# Patient Record
Sex: MALE | Race: WHITE | NOT HISPANIC OR LATINO | Employment: FULL TIME
[De-identification: names, ages, dates, MRNs, and addresses within clinical notes are randomized per-mention and may not be internally consistent; named-entity substitution may affect disease eponyms.]

---

## 2024-03-13 ENCOUNTER — TRANSCRIBE ORDERS (OUTPATIENT)
Dept: OTHER | Age: 59
End: 2024-03-13

## 2024-03-13 DIAGNOSIS — D03.9 MELANOMA IN SITU (H): Primary | ICD-10-CM

## 2024-03-14 ENCOUNTER — TELEPHONE (OUTPATIENT)
Dept: DERMATOLOGY | Facility: CLINIC | Age: 59
End: 2024-03-14

## 2024-03-14 NOTE — TELEPHONE ENCOUNTER
Called and spoke with Garrett.     Garrett does not have health insurance. He's working on getting insurance, but he has no exact date for when he will be able to secure it.     I am routing this to the financial counselor to advise further.     Pt will be in [place] 03/20-03/27 and unable to take calls while he is there.     Natasha Silva, Procedure  3/14/2024 10:54 AM

## 2024-03-28 NOTE — TELEPHONE ENCOUNTER
Routing again for an update.     Please advise if this needs to be routed to a different recipient.     Natasha Silva, Procedure  3/28/2024 4:16 PM

## 2024-04-26 NOTE — TELEPHONE ENCOUNTER
[facility] Call Center    Phone Message    May a detailed message be left on voicemail: yes     Reason for Call: Other: Pt said he will have health insurance as of May 1 - pt does not know what kind of any information. Pt will call back as soon as he has any information. Thanks      Action Taken: Message routed to:  [facility] Derm    Travel Screening: Not Applicable

## 2024-04-26 NOTE — TELEPHONE ENCOUNTER
[facility] Call Center    Phone Message    May a detailed message be left on voicemail: yes     Reason for Call: Other: please follow-up with pt on the finances.      Action Taken: Message routed to:  [facility] derm    Travel Screening: Not Applicable

## 2024-05-02 NOTE — TELEPHONE ENCOUNTER
Left vm for pt to call back with his new insurance.     Natasha Silva, Procedure  5/2/2024 8:16 AM

## 2024-05-13 NOTE — TELEPHONE ENCOUNTER
Patient calling back to schedule. Informed call center that dermatology surgery scheduler is out of office and message will be sent to her requesting she call him back. Call center states they will inform patient of this. Advised he call back tomorrow when  is here.     Rudi Dia, EMT

## 2024-06-01 NOTE — TELEPHONE ENCOUNTER
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 Lentigo maligna pattern.      RECORDS REQUESTED FROM:       Clinic name Comments Records Status Imaging Status   [facility] 2.29.24  Path # [id number]  CE

## 2024-06-03 ENCOUNTER — OFFICE VISIT (OUTPATIENT)
Dept: DERMATOLOGY | Facility: CLINIC | Age: 59
End: 2024-06-03
Payer: COMMERCIAL

## 2024-06-03 ENCOUNTER — PRE VISIT (OUTPATIENT)
Dept: DERMATOLOGY | Facility: CLINIC | Age: 59
End: 2024-06-03

## 2024-06-03 DIAGNOSIS — D03.21 MELANOMA IN SITU OF RIGHT EAR (H): Primary | ICD-10-CM

## 2024-06-03 PROCEDURE — 99204 OFFICE O/P NEW MOD 45 MIN: CPT | Mod: GC | Performed by: DERMATOLOGY

## 2024-06-03 RX ORDER — SIMVASTATIN 40 MG
40 TABLET ORAL
COMMUNITY
Start: 2024-02-16

## 2024-06-03 ASSESSMENT — PAIN SCALES - GENERAL: PAINLEVEL: NO PAIN (0)

## 2024-06-03 NOTE — NURSING NOTE
Chief Complaint   Patient presents with    Derm Problem     Patient presents for mohs consultation for melanoma in situ right superior bola of antihelix.      Myrna Flores LPN

## 2024-06-03 NOTE — PATIENT INSTRUCTIONS
In advance of your upcoming dermatologic surgery appointment, we wanted to send you a patient handout (see below) on Mohs micrographic surgery, so that you may have an idea of what to expect. This handout is quite detailed as it contains many of the most frequently asked questions that our patients ask.    Mohs Surgery Quick Tips    Please reserve the half day that you are with us and do not schedule any other appointments the morning of your surgery date. We cannot guarantee what time the surgery will be done as it will depend on how many times the Mohs surgeon has to go back and remove more tissue to completely remove your skin cancer.  If you suspect you will experience excess anxiety or claustrophobia during the procedure, please let the Mohs surgeon know prior to surgery to allow us time to address this. If an anti-anxiety medication is required, then you will need a designated  to drive you home.  Mohs surgery is done under local anesthesia, so you should eat breakfast and take your regularly scheduled medications. You do NOT need to fast.  Wear comfortable, loose-fitting clothing that can easily be taken off and put back on before and after surgery.  Most of your time with us will be spent waiting for tissue to be processed and carefully looked at under the microscope by the Mohs surgeon. Bring with you a book, tablet, or smartphone that you can use to spend the waiting time. You are allowed to eat lunch.  You should have a designated  if surgery will involve an area that can occlude vision. This is because you can get swelling/bruising immediately after surgery and will go home with a bulky bandage that could obstruct your view of driving safely.  In most cases, you will go home with a bulky pressure dressing over the site that will need to remain on, clean, and dry for the first 48 hours. You will not be able to get the site wet for the first 48 hours. This bulky pressure dressing is to help  prevent post-op bleeding. Your nurse will provide detailed wound care instructions verbally and in writing on the day of surgery.  The overwhelming majority of patients manage their normal post-op pain with Tylenol alternating with ibuprofen.   Any time the skin is cut surgically, including with Mohs surgery, a scar forms. Scars are unavoidable but are minimized with the Mohs surgery approach. A scar is thought to be better than a skin cancer that could become a serious risk to your future health. The goal with Mohs surgery is for the scar to be barely noticeable by an acquaintance talking with you at a normal conversational distance (say, 4-5 feet away) by 3 months after your surgery. In the meantime, please be patient as it takes about 3 months for scars to mature in appearance and begin to fade.  Do NOT wear make-up on the day of surgery if the skin cancer is on your face.  Do NOT use Neosporin on your wound. It is not effective at preventing infections and can lead to irritation similar to an allergic reaction at the wound site.  Do not use harsh soaps like Dial soap or [place] on your wound as this will also lead to irritation.    What is  Mohs micrographic surgery ?   Mohs micrographic surgery is considered the most effective treatment for many skin cancers. It is named after the late Dr. Frederic Mohs, who pioneered this technique. Note that  Mohs  surgery is not an abbreviation or acronym, but is named after Dr. Mohs.    Overview of Mohs Micrographic Surgery  Skin cancer often resembles the  tip of the iceberg  with more tumor cells growing downward and outward into the skin like the roots of a tree. These  roots  are not visible with the naked eye, but instead can be seen under a microscope. With the Mohs technique, the dermatologic surgeon can precisely identify and remove an entire tumor while leaving the surrounding healthy tissue intact and unharmed. Mohs surgery has the highest success rate of all  treatments for many skin cancers - up to 99%.  Mohs Surgeons: Fellowship-trained Mohs surgeons are specially trained as a cancer surgeon, pathologist, and reconstructive surgeon all in one.    Advantages of Mohs Surgery  Mohs surgery is unique and so effective because of the way the removed tissue is microscopically examined, evaluating 100% of the surgical margins. The pathologic interpretation of the tissue margins is done on site by the Mohs surgeon, who is specially trained in the reading of these slides.  Advantages of Mohs surgery include:  - Ensuring complete cancer removal during surgery to provide the best cure rate and protect against cancer recurrence  - Minimizing the amount of healthy tissue lost  - Maximizing the functional and cosmetic outcome resulting from surgery  - Repairing the site of the cancer the same day the cancer is removed (in most cases)  - Curing skin cancer when other methods have failed  Other skin cancer treatment methods require the provider to often blindly estimate the amount of tissue to treat and remove, which can result in the unnecessary removal of healthy skin tissue, as well as the skin cancer coming back if any part of it is left behind.     Your Mohs Procedure  Mohs skin cancer surgery is an outpatient procedure, which takes place in our on-site surgical suite. Our suite is equipped with a dedicated Mohs laboratory for microscopic examination of tissue. Surgeries start early in the morning and are completed the same day, depending on the extent of the tumor and the amount of reconstruction necessary.  First, you will receive local anesthesia (i.e., injections with lidocaine anesthetic) around the area of the tumor, so you are awake during the entire procedure. The use of local anesthesia versus general anesthesia provides many benefits, including preventing a lengthy recovery, possible side effects from general anesthesia, and cost. You are completely numb in the area of  the surgery, however, so the procedure is comfortable.      After the area has been numbed, all visible tumor, along with a thin layer of surrounding tissue, is taken out with a scalpel blade. After this, patients are bandaged by our nursing staff and can rest in the reception area, cafe on the first floor at [facility] [place] [place], or can remain in the patient room while awaiting testing results. A specially trained technician prepares the tissue and puts it on slides for your Mohs surgeon to examine under a microscope. Waiting time can range from 60-90 minutes while the tissue is being processed. If cancer involving the edges of the removed tissue is seen by the Mohs surgeon under the microscope, then another layer of tissue is removed from the area where the cancer was detected using a scalpel blade. This way only cancerous tissue is targeted during the procedure, minimizing the loss of healthy tissue. These steps are repeated until no cancer is remaining. Most skin cancers require 1 to 3 rounds for complete removal (all performed on the same day). Aggressive cancers can take several rounds of surgery to cure. Sometimes skin cancer can appear to be small to the naked eye but in fact is larger in reality. This means that the wound left behind after skin cancer removal could be larger than what you may expect, but remember that this may be necessary to remove all the skin cancer present. You do not want any skin cancer to be left behind as that will lead to the skin cancer coming back within months to years and may require a larger surgery.    After Your Skin Cancer is Removed  When your surgery is complete, your Mohs surgeon, who has expertise in reconstructive surgery, assesses the wound and discusses your options for the best functional and cosmetic outcome.      Closing the wound:  Depending on the size of the wound and what your Mohs surgeon decides, your wound will be closed using one of the following options:  Letting the  wound heal on its own from the edges and without stitches  Using stitches to close the wound in a line  Using stitches to close the wound by shifting skin from a nearby area of skin (called a  skin flap )  Using stitches to place a skin graft from another part of the body on the wound     Every wound is specially managed to maximize the functional and cosmetic outcome. For reconstruction on the head, we take great care to make sure stitches, if needed, do not interfere with the resting positions of the eyelids, nose, mouth, and ears, so we sometimes have to use facial plastic surgery techniques to close wounds. Patients are sometimes surprised by how many stitches are used, but this is because we want to do a good job at carefully lining up the wound edges to minimize scarring and prevent the wound from opening up later on, which could lead to an infection. We try to use stitches that self-dissolve whenever possible. In most cases, you will go home with a bulky pressure dressing over the surgical site that will need to remain on, clean, and dry for the first 48 hours. You will not be able to get the site wet for the first 48 hours. This bulky pressure dressing is to help prevent post-op bleeding. Your nurse will give you detailed wound care instructions verbally and in writing on the day of your surgery.     Post-op pain  Tylenol and ibuprofen are sufficient for pain control for the overwhelming majority of patients. If you have been told by another physician not to take Tylenol (also called acetaminophen) or ibuprofen (also called Motrin or Advil), then let your Mohs surgeon know. We typically suggest taking Tylenol 1,000 mg (i.e., two extra strength tabs) every 8 hours. In between those doses of Tylenol, you can take ibuprofen 400 mg (two tabs) every eight hours. This essentially means that you would take either Tylenol or ibuprofen alternating every four hours. Do NOT take more than 4,000 mg of Tylenol or 3,200  mg of ibuprofen per 24 hour period. Icing for 15 minutes every hour will help with pain and swelling as well, and it is especially helpful for surgeries around the eyes. Keeping the wound area elevated will also help with swelling and pain. If your wound is on your lower leg, then wearing compression stockings can reduce swelling and speed healing.     Expectations About Surgical Scars  Any time the skin is cut, the body forms a scar. This is normal and natural, and a scar is thought to be better than a skin cancer that could become a serious risk to your health. The goal with Mohs surgery is for the scar to be barely noticeable by an acquaintance talking with you at a normal conversational distance (say, 4-5 feet away) by 3 months after your surgery. In the meantime, please be patient as it takes about 3 months for scars to mature in appearance and begin to fade. It is important to wear sunscreen over a scar starting about 1 month after surgery as scars do not tan normally and can become discolored, compared with the surrounding skin, after sun exposure. Additionally, gentle scar massage can often be started about 1 month after surgery. This means gentle, kneading massage on the scar for a couple minutes several times a day. After three months of waiting to ensure all inflammation has resolved and the scar has matured, we are happy to see you if you have persistent concerns with scarring. On rare occasions, we perform steroid injections or use a laser to treat a scar. These types of treatments are not  one-and-done  and multiple sessions are usually necessary to help scar appearance.    Risks:    Scar formation at the site of tumor removal. You may need further treatment with steroid injections/lasers for scarring.  Larger than expected wound created upon removal of the skin cancer.   Poor wound healing, which may be due to the patient's underlying health conditions or failure of the wound repair method.    Excessive bleeding from the wound, which could affect wound healing and/or result in the need for more office visits.    Wound that becomes infected (an uncommon occurrence, minimized by using sterile technique).   Loss of nerve function (muscle or feeling) if a tumor invades a nerve, which can be temporary or permanent.   Regrowth of tumor after removal (more common with previously treated tumors and large, longstanding tumors).   Cosmetic or functional deformities if tumor is near or on an important structure such as eyes, eyelids, nose, ears, lips, forehead, scalp, fingers, or genital area.     If you have any questions, please feel free to contact us.  During business hours (M-F 8:00-5:00 p.m.)    Dermatology Clinic  [phone]

## 2024-06-03 NOTE — LETTER
6/3/2024       RE: Garrett Parker  [id number]  [place] [address]     Dear Colleague,    Thank you for referring your patient, Garrett Parker, to the [facility] [place] at [facility]. Please see a copy of my visit note below.    Dermatologic Surgery Consultation Note    Dermatology Problem List:  MIS/LM, right superior kelsey of antihelix, s/p shave 2/29/24, MMS pending    CC: Derm Problem (Patient presents for mohs consultation for melanoma in situ right superior kelsey of antihelix. )      Subjective: Garrett Parker is a 58 year old male who presents today for Mohs micrographic surgery consultation for a recent diagnosis of skin cancer.  - Skin cancer(s): Melanoma in situ  - Location(s): R Superior Kelsey of Antihelix  - Referred by Marcelle Piña MD.   - No history of MMS.   - Works as , works at a computer.  - Taking fishing vacation from late June to 7/4/24.    - no other concerns today    Hx of Skin Cancer: No  Hx of Mohs Surgery: No  Transplant: No  Immunocompromised: No  Current Anticoagulant(s): None  Bleeding Disorder(s): No  Stent: No  Pacemaker: No  Defibrillator: No  Brain/Nerve Stimulator: No  Endocarditis/Rheumatic Fever Hx: No  Vascular graft: No  Prophylactic Antibiotic Needed: No  Congenital heart defect: No  Prosthetic Heart Valve: No  Lesion on Leg/Groin: No  Prosthetic Joint : No  Diabetic: No  HIV/AIDS: No  Hepatitis: No  Pregnant: No  Prior Problem with Local Anesthesia: No  Patient wears CPAP mask: No  Currently Hypertensive: No  Photoprotection: for extended outdoor activities only  Sunburns: 1-5 in lifetime  Tanning Bed Use: never  Current Tobacco Use: No  Current Alcohol Use: Yes  Extended Care Facility: No  Occupation: Arcitecture   needed?: No  Do you have mobility issues?: No  Do you use any assistive devices/DME?: No  Do you have any issues with lying for long periods of  time?: No      Objective: An exam of the right superior kelsey of antihelix was performed today   - right antihelix: 0.6 x 0.4 cm pink scarred papule.   - No palpable cervical, preauricular, or postauricular lymphadenopathy.     Path report:   Case: [id number]   Collected: 02/29/2024     FINAL DIAGNOSIS    A.  Skin, Right Superior Kelsey of Antihelix, shave:  - MELANOMA IN SITU, lentigo maligna pattern, extending to the peripheral edge of the specimen, overlying an intradermal nevus that extends to the base of the specimen, see comment.     Comment A: The sections show a broad junctional proliferation of single and nested atypical melanocytes along the dermoepidermal junction and a collection of bland melanocytes in the dermis. The junctional component stains positively with PRAME, supporting the diagnosis of melanoma in situ. No increased proliferation is seen within the dermal component on the dual Melan-A/Ki67 immunostain. The dermal component stains negatively with PRAME and positively with p16, supporting the diagnosis of intradermal nevus. AN EXCISION OF THIS SITE according to melanoma care guidelines is recommended.        Assessment and Plan:     1. Plan for Mohs micrographic surgery for skin cancer(s) above:  *Review lab result(s): Dermpath report   - We discussed the nature of the diagnosis/condition above. We discussed the treatment options, including the risks benefits and expectations of these options. We recommend micrographic surgery as the most effective and most tissue sparing option for treatment, and the patient agrees to proceed with this.  The patient is aware of the risks, benefits and expectations of this procedure. The patient will be scheduled for this procedure, if not already done so.  - We anticipate the following closure type: Secondary intent vs Graft vs. Pull through flap    Patient was discussed with and evaluated by attending physician Dr. Paul Flores.    Adali Mullins MD  Micrographic  Surgery and Dermatologic Oncology (MSDO) Fellow, PGY-5    Staff Involved:   Scribe/Fellow/Staff    Scribe Disclosure:   I, KELLY PATHAK, am serving as a scribe; to document services personally performed by Paul Flores MD -based on data collection and the provider's statements to me.     Attending Attestation  I attest that the Fellow recorded the interview and exam that I personally performed.  I have reviewed the note and edited it as necessary.    Paul Flores M.D.  Professor  Director of Dermatologic Surgery  Department of Dermatology  [facility]

## 2024-06-03 NOTE — PROGRESS NOTES
Dermatologic Surgery Consultation Note    Dermatology Problem List:  MIS/LM, right superior kelsey of antihelix, s/p shave 2/29/24, MMS pending    CC: Derm Problem (Patient presents for mohs consultation for melanoma in situ right superior kelsey of antihelix. )      Subjective: Garrett Parker is a 58 year old male who presents today for Mohs micrographic surgery consultation for a recent diagnosis of skin cancer.  - Skin cancer(s): Melanoma in situ  - Location(s): R Superior Kelsey of Antihelix  - Referred by Marcelle Piña MD.   - No history of MMS.   - Works as , works at a computer.  - Taking fishing vacation from late June to 7/4/24.    - no other concerns today    Hx of Skin Cancer: No  Hx of Mohs Surgery: No  Transplant: No  Immunocompromised: No  Current Anticoagulant(s): None  Bleeding Disorder(s): No  Stent: No  Pacemaker: No  Defibrillator: No  Brain/Nerve Stimulator: No  Endocarditis/Rheumatic Fever Hx: No  Vascular graft: No  Prophylactic Antibiotic Needed: No  Congenital heart defect: No  Prosthetic Heart Valve: No  Lesion on Leg/Groin: No  Prosthetic Joint : No  Diabetic: No  HIV/AIDS: No  Hepatitis: No  Pregnant: No  Prior Problem with Local Anesthesia: No  Patient wears CPAP mask: No  Currently Hypertensive: No  Photoprotection: for extended outdoor activities only  Sunburns: 1-5 in lifetime  Tanning Bed Use: never  Current Tobacco Use: No  Current Alcohol Use: Yes  Extended Care Facility: No  Occupation: Arcitecture   needed?: No  Do you have mobility issues?: No  Do you use any assistive devices/DME?: No  Do you have any issues with lying for long periods of time?: No      Objective: An exam of the right superior kelsey of antihelix was performed today   - right antihelix: 0.6 x 0.4 cm pink scarred papule.   - No palpable cervical, preauricular, or postauricular lymphadenopathy.     Path report:   Case: [id number]   Collected: 02/29/2024     FINAL DIAGNOSIS    A.  Skin, Right Superior  Kelsey of Antihelix, shave:  - MELANOMA IN SITU, lentigo maligna pattern, extending to the peripheral edge of the specimen, overlying an intradermal nevus that extends to the base of the specimen, see comment.     Comment A: The sections show a broad junctional proliferation of single and nested atypical melanocytes along the dermoepidermal junction and a collection of bland melanocytes in the dermis. The junctional component stains positively with PRAME, supporting the diagnosis of melanoma in situ. No increased proliferation is seen within the dermal component on the dual Melan-A/Ki67 immunostain. The dermal component stains negatively with PRAME and positively with p16, supporting the diagnosis of intradermal nevus. AN EXCISION OF THIS SITE according to melanoma care guidelines is recommended.        Assessment and Plan:     1. Plan for Mohs micrographic surgery for skin cancer(s) above:  *Review lab result(s): Dermpath report   - We discussed the nature of the diagnosis/condition above. We discussed the treatment options, including the risks benefits and expectations of these options. We recommend micrographic surgery as the most effective and most tissue sparing option for treatment, and the patient agrees to proceed with this.  The patient is aware of the risks, benefits and expectations of this procedure. The patient will be scheduled for this procedure, if not already done so.  - We anticipate the following closure type: Secondary intent vs Graft vs. Pull through flap    Patient was discussed with and evaluated by attending physician Dr. Paul Flores.    Adali Mullins MD  Micrographic Surgery and Dermatologic Oncology (MSDO) Fellow, PGY-5    Staff Involved:   Scribe/Fellow/Staff    Scribe Disclosure:   I, KELLY PATHAK, am serving as a scribe; to document services personally performed by Paul Flores MD -based on data collection and the provider's statements to me.     Attending Attestation  I attest that the  Fellow recorded the interview and exam that I personally performed.  I have reviewed the note and edited it as necessary.    Paul Flores M.D.  Professor  Director of Dermatologic Surgery  Department of Dermatology  [facility]

## 2024-06-04 NOTE — TELEPHONE ENCOUNTER
needs mohs appt  Received: Yesterday  Adali Mullins MD Siegfried, Marta  Please contact patient to schedule mohs. Going on a trip soon until 7/4. Ok to wait until after he gets back.    Thanks!    Left vm.     Natasha Silva, Abbey  6/4/2024 3:22 PM

## 2024-07-23 ENCOUNTER — TELEPHONE (OUTPATIENT)
Dept: DERMATOLOGY | Facility: CLINIC | Age: 59
End: 2024-07-23
Payer: COMMERCIAL

## 2024-07-23 NOTE — TELEPHONE ENCOUNTER
Called and scheduled with pt.     Called patient to schedule surgery with Dr. Flores    Date of Surgery: 09/18    Surgery type: Mohs    Consult scheduled: Yes    Has patient had mohs with us before? No    Additional comments: pt requested appt date due to travel plans      Natasha Silva on 7/23/2024 at 8:53 AM

## 2024-07-23 NOTE — TELEPHONE ENCOUNTER
[facility] Call Center    Phone Message    May a detailed message be left on voicemail: yes     Reason for Call: Other: Pt called and would like to schedule his surgery with Dr. Flores. Please call him back. Thanks      Action Taken: Message routed to:  [facility]ERM    Travel Screening: Not Applicable     Date of Service:

## 2024-09-18 ENCOUNTER — TELEPHONE (OUTPATIENT)
Dept: DERMATOLOGY | Facility: CLINIC | Age: 59
End: 2024-09-18

## 2024-09-18 ENCOUNTER — OFFICE VISIT (OUTPATIENT)
Dept: DERMATOLOGY | Facility: CLINIC | Age: 59
End: 2024-09-18
Payer: COMMERCIAL

## 2024-09-18 VITALS — SYSTOLIC BLOOD PRESSURE: 126 MMHG | HEART RATE: 74 BPM | DIASTOLIC BLOOD PRESSURE: 82 MMHG

## 2024-09-18 DIAGNOSIS — D03.21: ICD-10-CM

## 2024-09-18 PROCEDURE — 88305 TISSUE EXAM BY PATHOLOGIST: CPT | Mod: TC | Performed by: DERMATOLOGY

## 2024-09-18 PROCEDURE — 17311 MOHS 1 STAGE H/N/HF/G: CPT | Mod: GC | Performed by: DERMATOLOGY

## 2024-09-18 PROCEDURE — 15260 FTH/GFT FR N/E/E/L 20 SQCM/<: CPT | Mod: GC | Performed by: DERMATOLOGY

## 2024-09-18 PROCEDURE — 88342 IMHCHEM/IMCYTCHM 1ST ANTB: CPT | Mod: 59 | Performed by: DERMATOLOGY

## 2024-09-18 PROCEDURE — 88305 TISSUE EXAM BY PATHOLOGIST: CPT | Mod: 26 | Performed by: DERMATOLOGY

## 2024-09-18 ASSESSMENT — PAIN SCALES - GENERAL: PAINLEVEL: NO PAIN (0)

## 2024-09-18 NOTE — NURSING NOTE
Chief Complaint   Patient presents with    Mohs     Mohs and reconstruction to right superior bola of antihelix     Brooklyn HERNANDEZ CMA

## 2024-09-18 NOTE — LETTER
9/18/2024       RE: Garrett Parker  [address] [facility] [address]     Dear Colleague,    Thank you for referring your patient, Garrett Parker, to the [facility] DERMATOLOGIC SURGERY CLINIC [place] at [facility]. Please see a copy of my visit note below.    [facility] Mohs Surgery Procedure Note    Case #: 1  Date of Service:  Sep 18, 2024  Surgery: Mohs micrographic surgery (MMS)  Staff surgeon: Paul Flores MD  Fellow surgeon: Carlos Raymond MD  Resident surgeon: Marianna Gutierrez MD     Tumor Type: Melanoma in situ  Location: Right superior bola of anterior antihelix  Derm-Path Accession #: [id number]     Mohs Accession #:   Pre-Op Size: 0.8 cm x 0.8 cm  Final Defect Size: 1.6 cm x 1.6 cm  Number of Mohs stages: 1  Level of Defect: Perichondrium  Local anesthetic: 3 mL 1% lidocaine with epinephrine  Repair Type: FTSG  Repair Size: 1.5 x 1.3  cm  Suture Material: 4-0 Monocryl; 5-0 Vicryl Rapide    Procedure:    Stage I  We discussed the principles of treatment and most likely complications including scarring, bleeding, infection, swelling, pain, crusting, nerve damage, large wound,  incomplete excision, wound dehiscence,  nerve damage, recurrence, and a second procedure may be recommended to obtain the best cosmetic or functional result.    Informed consent was obtained and the patient underwent the procedure as follows:  The patient was placed supine on the operating table.  The cancer was identified, outlined with a marker, and verified by the patient.  The entire surgical field was prepped with chlorhexidine.  The surgical site was anesthetized using lidocaine with epinephrine.    The area of clinically apparent tumor was excised and sent for permanent sections to rule out invasive melanoma. The peripheral rim of tissue was then surgically excised using a #15 blade and was then transferred onto a specimen sheet  maintaining the orientation of the specimen. Hemostasis was obtained using bipolar electrocoagulation. The wound site was then covered with a dressing while the tissue samples were processed for examination.    The excised tissue was transported to the Mohs histology laboratory maintaining the tissue orientation.  The tissue specimen was relaxed so that the entire surgical margin was in a a single horizontal plane for sectioning and inked for precise mapping.  A precise reference map was drawn to reflect the sectioning of the specimen, colored inking of the margins, and orientation on the patient. The tissue was processed using horizontal sectioning of the base and continuous peripheral margins. Vertical, bread loafed sections were obtained from the central portion of the lesion, prior to being sent for permament sections. A control biopsy at the preauricular was taken to compare the density and periodicity of melanocytes along the dermal-epidermal junction.     The tissue sections were stained with Hematoxylin and Eosin (H&E), as well as Melanoma antigen recognized by T cells or Melan-A (MART-1) stain. The histopathologic sections were reviewed in conjunction with the reference map.     Total blocks: 2   Total slides:  8    Within the central portion of the lesion, there was increased density and periodicity of atypical-appearing melanocytes with areas of confluence at the epidermis, consistent with diagnosis above.    Was the skin lesion clear at this stage?: Yes, the skin lesion was determined clear at periphery at this stage: There was a relatively normal periodicity and density of melanocytes along the dermal-epidermal junction noted at the peripheral margins, therefore Mohs surgery was complete.    REPAIR: Full Thickness Skin Graft with Complex Repair of Donor Site    PROCEDURE:  The patient was placed supine on the operating room table.  The defect was measured and identified.  A template was made.  The  psotauricular donor site was chosen as this had the closest color and texture match to the surrounding defect skin.  An anticipated graft design was drawn at the donor site.  The graft recipient site and donor site were then infiltrated with 1% lidocaine with epinephrine and both areas were prepped with chlorhexidine and draped in a sterile fashion.    Hemostasis was obtained with electrocoagulation.  The donor site was then addressed.  The graft (1.5 cm x 1.3 cm) was harvested using a #15 blade by excising to the level of fat.  The graft was placed on saline-soaked gauze.  The defect was closed after undermining bluntly in all directions and hemostasis obtained with electrocoagulation.  The wound edges were approximated using 4-0 Monocryl (final donor site wound length: 2.9 cm).  Fibrofatty tissue was trimmed away with tissue scissors to thin the graft.  The graft was then placed over the defect and secured with anchoring sutures with 5-0 Vicryl Rapide.  The graft was trimmed to fit the defect and secured fully with bolster sutures.  The graft and donor sites were cleansed with saline.  A bolster dressing was placed over the graft comprised of a layer of ointment and Xeroform gauze and was secured using sutures.  An ointment and non-adherent pressure dressing was applied to the donor site.  Wound care instructions were given verbally and in writing.  The patient left the operating room in stable condition.      Dr. Carlos Raymond (Mohs micrographic surgery fellow) performed the Mohs micrographic surgery and reconstruction under the direct supervision of Paul Flores MD, who was present for the entire micrographic surgery and key portions of the reconstruction, and always immediately available.    Paul Flores MD was present for the key portions of the procedure and always immediately available.       Attestation signed by Paul Flores MD at 9/23/2024 11:41 AM:    Attending attestation:  I was present for key elements  of the procedure and immediately available for all other portions of the procedure.  I have reviewed the note and edited it as necessary.    Paul Flores M.D.  Professor  Director of Dermatologic Surgery  Department of Dermatology  [facility]    [place]at[place] [address]      Again, thank you for allowing me to participate in the care of your patient.      Sincerely,    Paul Flores MD

## 2024-09-18 NOTE — PROGRESS NOTES
[facility] Mohs Surgery Procedure Note    Case #: 1  Date of Service:  Sep 18, 2024  Surgery: Mohs micrographic surgery (MMS)  Staff surgeon: Paul Flores MD  Fellow surgeon: Carlos Raymond MD  Resident surgeon: Marianna Gutierrez MD     Tumor Type: Melanoma in situ  Location: Right superior bola of anterior antihelix  Derm-Path Accession #: [id number]     Mohs Accession #:   Pre-Op Size: 0.8 cm x 0.8 cm  Final Defect Size: 1.6 cm x 1.6 cm  Number of Mohs stages: 1  Level of Defect: Perichondrium  Local anesthetic: 3 mL 1% lidocaine with epinephrine  Repair Type: FTSG  Repair Size: 1.5 x 1.3  cm  Suture Material: 4-0 Monocryl; 5-0 Vicryl Rapide    Procedure:    Stage I  We discussed the principles of treatment and most likely complications including scarring, bleeding, infection, swelling, pain, crusting, nerve damage, large wound,  incomplete excision, wound dehiscence,  nerve damage, recurrence, and a second procedure may be recommended to obtain the best cosmetic or functional result.    Informed consent was obtained and the patient underwent the procedure as follows:  The patient was placed supine on the operating table.  The cancer was identified, outlined with a marker, and verified by the patient.  The entire surgical field was prepped with chlorhexidine.  The surgical site was anesthetized using lidocaine with epinephrine.    The area of clinically apparent tumor was excised and sent for permanent sections to rule out invasive melanoma. The peripheral rim of tissue was then surgically excised using a #15 blade and was then transferred onto a specimen sheet maintaining the orientation of the specimen. Hemostasis was obtained using bipolar electrocoagulation. The wound site was then covered with a dressing while the tissue samples were processed for examination.    The excised tissue was transported to the [facility] histology laboratory maintaining the tissue orientation.  The tissue  specimen was relaxed so that the entire surgical margin was in a a single horizontal plane for sectioning and inked for precise mapping.  A precise reference map was drawn to reflect the sectioning of the specimen, colored inking of the margins, and orientation on the patient. The tissue was processed using horizontal sectioning of the base and continuous peripheral margins. Vertical, bread loafed sections were obtained from the central portion of the lesion, prior to being sent for permament sections. A control biopsy at the preauricular was taken to compare the density and periodicity of melanocytes along the dermal-epidermal junction.     The tissue sections were stained with Hematoxylin and Eosin (H&E), as well as Melanoma antigen recognized by T cells or Melan-A (MART-1) stain. The histopathologic sections were reviewed in conjunction with the reference map.     Total blocks: 2   Total slides:  8    Within the central portion of the lesion, there was increased density and periodicity of atypical-appearing melanocytes with areas of confluence at the epidermis, consistent with diagnosis above.    Was the skin lesion clear at this stage?: Yes, the skin lesion was determined clear at periphery at this stage: There was a relatively normal periodicity and density of melanocytes along the dermal-epidermal junction noted at the peripheral margins, therefore Mohs surgery was complete.    REPAIR: Full Thickness Skin Graft with Complex Repair of Donor Site    PROCEDURE:  The patient was placed supine on the operating room table.  The defect was measured and identified.  A template was made.  The psotauricular donor site was chosen as this had the closest color and texture match to the surrounding defect skin.  An anticipated graft design was drawn at the donor site.  The graft recipient site and donor site were then infiltrated with 1% lidocaine with epinephrine and both areas were prepped with chlorhexidine and draped in  a sterile fashion.    Hemostasis was obtained with electrocoagulation.  The donor site was then addressed.  The graft (1.5 cm x 1.3 cm) was harvested using a #15 blade by excising to the level of fat.  The graft was placed on saline-soaked gauze.  The defect was closed after undermining bluntly in all directions and hemostasis obtained with electrocoagulation.  The wound edges were approximated using 4-0 Monocryl (final donor site wound length: 2.9 cm).  Fibrofatty tissue was trimmed away with tissue scissors to thin the graft.  The graft was then placed over the defect and secured with anchoring sutures with 5-0 Vicryl Rapide.  The graft was trimmed to fit the defect and secured fully with bolster sutures.  The graft and donor sites were cleansed with saline.  A bolster dressing was placed over the graft comprised of a layer of ointment and Xeroform gauze and was secured using sutures.  An ointment and non-adherent pressure dressing was applied to the donor site.  Wound care instructions were given verbally and in writing.  The patient left the operating room in stable condition.      Dr. Carlos Raymond (Mohs micrographic surgery fellow) performed the Mohs micrographic surgery and reconstruction under the direct supervision of Paul Flores MD, who was present for the entire micrographic surgery and key portions of the reconstruction, and always immediately available.    Paul Flores MD was present for the key portions of the procedure and always immediately available.

## 2024-09-18 NOTE — TELEPHONE ENCOUNTER
Follow up call attempted & left voicemail following Mohs procedure with Dr. Flores.     Advised if he has any questions to call us back at number provided.     Please call [phone] if you have any questions or concerns.

## 2024-09-20 LAB
PATH REPORT.COMMENTS IMP SPEC: NORMAL
PATH REPORT.COMMENTS IMP SPEC: NORMAL
PATH REPORT.FINAL DX SPEC: NORMAL
PATH REPORT.GROSS SPEC: NORMAL
PATH REPORT.MICROSCOPIC SPEC OTHER STN: NORMAL
PATH REPORT.RELEVANT HX SPEC: NORMAL

## 2024-09-26 ENCOUNTER — TELEPHONE (OUTPATIENT)
Dept: DERMATOLOGY | Facility: CLINIC | Age: 59
End: 2024-09-26
Payer: COMMERCIAL

## 2024-09-26 NOTE — TELEPHONE ENCOUNTER
[facility] Call Center    Phone Message    May a detailed message be left on voicemail: yes     Reason for Call: Other: returning call from Marcelle, possible labs. Thanks      Action Taken: Message routed to:  [facility] Derm    Travel Screening: Not Applicable     Date of Service:

## 2024-09-30 NOTE — PROGRESS NOTES
[facility] Dermatology Note  Encounter Date: Oct 2, 2024  Store-and-Forward and Telephone. Location of teledermatologist: [facility] DERMATOLOGIC SURGERY CLINIC [place].  Start time: 150. End time: 1.54.    Dermatology Problem List:  1. History of melanoma  - MiS/LM, R superior bola of antihelix, s/p shave bx 2/29/24, s/p MMS 09/18/24    CC: Derm Problem (2 week wound check)      Subjective: Garrett Parker is a 59 year old male who presents today for wound check after MMS with FTSG repair of melanoma in situ on the right superior bola of anterior antihelix performed on 09/18/24.   -  wound is well healed, no graft failure, slight crusting laterally.  - no other concerns today    Objective:   Focused examination of the right ear within the teledermatology photograph(s) on 10/02/24 was performed.   - The surgical site noted above is clean, dry, and intact. There is no surrounding erythema or purulence. No clinical evidence of infection noted today.        Assessment and Plan:     1. S/p MMS with FTSG repair of melanoma in situ on the right superior bola of anterior antihelix performed on 09/18/24  - The patient's surgery site(s) is/are healing very well. No evidence of infection on examination today.  - The patient was told to continue with wound cares until the area(s) is/are no longer crusted, approximately 7-10 days  - The patient should follow up with dermatologic surgery PRN, as well as continue with regular skin exams in general dermatology clinic.    Patient was discussed with and evaluated by attending physician Dr. Paul Flores.    Staff Involved:  Staff/Scribe/Fellow    Scribe Disclosure:   I, Rebekah Ivory, am serving as a scribe; to document services personally performed by Paul Flores MD, based on data collection and the provider's statements to me.     Provider Disclosure:  I agree with the above history, review of systems, physical exam and plan.  I have reviewed the  content of the documentation and have edited it as needed. I have personally performed the services documented here and the documentation accurately represents those services and the decisions I have made.      Electronically signed by:  Attending Attestation  I attest that the Scribe recorded the interview and exam that I personally performed.  I have reviewed the note and edited it as necessary.    Paul Flores M.D.  Professor  Director of Dermatologic Surgery  Department of Dermatology  [facility]

## 2024-10-02 ENCOUNTER — VIRTUAL VISIT (OUTPATIENT)
Dept: DERMATOLOGY | Facility: CLINIC | Age: 59
End: 2024-10-02
Payer: COMMERCIAL

## 2024-10-02 DIAGNOSIS — Z86.006 HISTORY OF MELANOMA IN SITU: Primary | ICD-10-CM

## 2024-10-02 DIAGNOSIS — Z48.89 ENCOUNTER FOR POSTOPERATIVE CARE: ICD-10-CM

## 2024-10-02 PROCEDURE — 99024 POSTOP FOLLOW-UP VISIT: CPT | Mod: 93 | Performed by: DERMATOLOGY

## 2024-10-02 NOTE — NURSING NOTE
Chief Complaint   Patient presents with    Derm Problem     2 week wound check     Staci NOWAK RN  Dermatology Surgery  [phone]

## 2024-10-02 NOTE — LETTER
10/2/2024       RE: Garrett Parker  [address] [facility] [address]     Dear Colleague,    Thank you for referring your patient, Garrett Parker, to the [facility] DERMATOLOGIC SURGERY CLINIC [place] at [facility]. Please see a copy of my visit note below.    [facility] Dermatology Note  Encounter Date: Oct 2, 2024  Store-and-Forward and Telephone. Location of teledermatologist: [facility] DERMATOLOGIC SURGERY CLINIC [place].  Start time: 150. End time: 1.54.    Dermatology Problem List:  1. History of melanoma  - MiS/LM, R superior bola of antihelix, s/p shave bx 2/29/24, s/p MMS 09/18/24    CC: Derm Problem (2 week wound check)      Subjective: Garrett Parker is a 59 year old male who presents today for wound check after MMS with FTSG repair of melanoma in situ on the right superior bola of anterior antihelix performed on 09/18/24.   -  wound is well healed, no graft failure, slight crusting laterally.  - no other concerns today    Objective:   Focused examination of the right ear within the teledermatology photograph(s) on 10/02/24 was performed.   - The surgical site noted above is clean, dry, and intact. There is no surrounding erythema or purulence. No clinical evidence of infection noted today.        Assessment and Plan:     1. S/p MMS with FTSG repair of melanoma in situ on the right superior bola of anterior antihelix performed on 09/18/24  - The patient's surgery site(s) is/are healing very well. No evidence of infection on examination today.  - The patient was told to continue with wound cares until the area(s) is/are no longer crusted, approximately 7-10 days  - The patient should follow up with dermatologic surgery PRN, as well as continue with regular skin exams in general dermatology clinic.    Patient was discussed with and evaluated by attending physician Dr. Paul Flores.    Staff  Involved:  Staff/Scribe/Fellow    Scribe Disclosure:   I, Rebekah Cachorro, am serving as a scribe; to document services personally performed by Paul Flores MD, based on data collection and the provider's statements to me.     Provider Disclosure:  I agree with the above history, review of systems, physical exam and plan.  I have reviewed the content of the documentation and have edited it as needed. I have personally performed the services documented here and the documentation accurately represents those services and the decisions I have made.      Electronically signed by:  Attending Attestation  I attest that the Scribe recorded the interview and exam that I personally performed.  I have reviewed the note and edited it as necessary.    Paul Flores M.D.  Professor  Director of Dermatologic Surgery  Department of Dermatology  [facility]        Again, thank you for allowing me to participate in the care of your patient.      Sincerely,    Paul Flores MD

## 2024-10-06 ENCOUNTER — HEALTH MAINTENANCE LETTER (OUTPATIENT)
Age: 59
End: 2024-10-06